# Patient Record
Sex: MALE
[De-identification: names, ages, dates, MRNs, and addresses within clinical notes are randomized per-mention and may not be internally consistent; named-entity substitution may affect disease eponyms.]

---

## 2021-09-24 ENCOUNTER — HOSPITAL ENCOUNTER (EMERGENCY)
Dept: HOSPITAL 63 - ER | Age: 21
Discharge: HOME | End: 2021-09-24
Payer: OTHER GOVERNMENT

## 2021-09-24 VITALS — DIASTOLIC BLOOD PRESSURE: 73 MMHG | SYSTOLIC BLOOD PRESSURE: 151 MMHG

## 2021-09-24 VITALS — HEIGHT: 71 IN | BODY MASS INDEX: 30.71 KG/M2 | WEIGHT: 219.36 LBS

## 2021-09-24 DIAGNOSIS — E87.6: Primary | ICD-10-CM

## 2021-09-24 DIAGNOSIS — R11.2: ICD-10-CM

## 2021-09-24 DIAGNOSIS — F17.210: ICD-10-CM

## 2021-09-24 LAB
ALBUMIN SERPL-MCNC: 3.6 G/DL (ref 3.4–5)
ALBUMIN/GLOB SERPL: 0.9 {RATIO} (ref 1–1.7)
ALP SERPL-CCNC: 86 U/L (ref 46–116)
ALT SERPL-CCNC: 34 U/L (ref 16–63)
ANION GAP SERPL CALC-SCNC: 10 MMOL/L (ref 6–14)
APTT PPP: YELLOW S
AST SERPL-CCNC: 14 U/L (ref 15–37)
BACTERIA #/AREA URNS HPF: 0 /HPF
BASOPHILS # BLD AUTO: 0.1 X10^3/UL (ref 0–0.2)
BASOPHILS NFR BLD: 1 % (ref 0–3)
BILIRUB SERPL-MCNC: 0.5 MG/DL (ref 0.2–1)
BILIRUB UR QL STRIP: (no result)
BUN/CREAT SERPL: 9 (ref 6–20)
CA-I SERPL ISE-MCNC: 8 MG/DL (ref 8–26)
CALCIUM SERPL-MCNC: 9.5 MG/DL (ref 8.5–10.1)
CHLORIDE SERPL-SCNC: 104 MMOL/L (ref 98–107)
CO2 SERPL-SCNC: 29 MMOL/L (ref 21–32)
CREAT SERPL-MCNC: 0.9 MG/DL (ref 0.7–1.3)
EOSINOPHIL NFR BLD: 0.3 X10^3/UL (ref 0–0.7)
EOSINOPHIL NFR BLD: 3 % (ref 0–3)
ERYTHROCYTE [DISTWIDTH] IN BLOOD BY AUTOMATED COUNT: 14.1 % (ref 11.5–14.5)
FIBRINOGEN PPP-MCNC: CLEAR MG/DL
GFR SERPLBLD BASED ON 1.73 SQ M-ARVRAT: 107.6 ML/MIN
GLOBULIN SER-MCNC: 3.9 G/DL (ref 2.2–3.8)
GLUCOSE SERPL-MCNC: 92 MG/DL (ref 70–99)
GLUCOSE UR STRIP-MCNC: (no result) MG/DL
HCT VFR BLD CALC: 46.1 % (ref 39–53)
HGB BLD-MCNC: 15.4 G/DL (ref 13–17.5)
LIPASE: 49 U/L (ref 73–393)
LYMPHOCYTES # BLD: 3.2 X10^3/UL (ref 1–4.8)
LYMPHOCYTES NFR BLD AUTO: 36 % (ref 24–48)
MAGNESIUM SERPL-MCNC: 2.1 MG/DL (ref 1.8–2.4)
MCH RBC QN AUTO: 30 PG (ref 25–35)
MCHC RBC AUTO-ENTMCNC: 33 G/DL (ref 31–37)
MCV RBC AUTO: 89 FL (ref 79–100)
MONO #: 0.6 X10^3/UL (ref 0–1.1)
MONOCYTES NFR BLD: 7 % (ref 0–9)
NEUT #: 4.5 X10^3UL (ref 1.8–7.7)
NEUTROPHILS NFR BLD AUTO: 52 % (ref 31–73)
NITRITE UR QL STRIP: (no result)
PLATELET # BLD AUTO: 184 X10^3/UL (ref 140–400)
POTASSIUM SERPL-SCNC: 3.2 MMOL/L (ref 3.5–5.1)
PROT SERPL-MCNC: 7.5 G/DL (ref 6.4–8.2)
RBC # BLD AUTO: 5.2 X10^6/UL (ref 4.3–5.7)
RBC #/AREA URNS HPF: 0 /HPF (ref 0–2)
SODIUM SERPL-SCNC: 143 MMOL/L (ref 136–145)
SP GR UR STRIP: 1.02
SQUAMOUS #/AREA URNS LPF: (no result) /LPF
UROBILINOGEN UR-MCNC: 0.2 MG/DL
WBC # BLD AUTO: 8.7 X10^3/UL (ref 4–11)
WBC #/AREA URNS HPF: (no result) /HPF (ref 0–4)

## 2021-09-24 PROCEDURE — 80053 COMPREHEN METABOLIC PANEL: CPT

## 2021-09-24 PROCEDURE — 36415 COLL VENOUS BLD VENIPUNCTURE: CPT

## 2021-09-24 PROCEDURE — 81001 URINALYSIS AUTO W/SCOPE: CPT

## 2021-09-24 PROCEDURE — 96374 THER/PROPH/DIAG INJ IV PUSH: CPT

## 2021-09-24 PROCEDURE — 96361 HYDRATE IV INFUSION ADD-ON: CPT

## 2021-09-24 PROCEDURE — 96375 TX/PRO/DX INJ NEW DRUG ADDON: CPT

## 2021-09-24 PROCEDURE — 83735 ASSAY OF MAGNESIUM: CPT

## 2021-09-24 PROCEDURE — 85025 COMPLETE CBC W/AUTO DIFF WBC: CPT

## 2021-09-24 PROCEDURE — 83690 ASSAY OF LIPASE: CPT

## 2021-09-24 PROCEDURE — 99284 EMERGENCY DEPT VISIT MOD MDM: CPT

## 2021-09-24 NOTE — PHYS DOC
Past History


Past Medical History:  No Pertinent History


Past Surgical History:  Other


Additional Past Surgical Histo:  wisdom teeth extraction


Smoking:  Cigarettes


Alcohol Use:  None


Drug Use:  None





General Adult


EDM:


Chief Complaint:  NAUSEA/VOMITING/DIARRHEA





HPI:


HPI:





21-year-old male presents with report of nausea and vomiting x3 episodes that 

started last night.  Patient reports he vomited again this morning after eating.

 Patient had worked during the night and was instructed to present to the ER for

evaluation.  Patient reports concern for possible dehydration.  Denies abdominal

discomfort.  Denies fever or chills.  Denies known exposure to COVID-19.  Denies

cough, shortness of breath, or decreased sense of smell or taste.  Patient has 

been COVID-19 vaccinated with Moderna x 2.





Review of Systems:


Review of Systems:





Constitutional: Denies fever or chills 


Eyes: Denies redness or eye pain 


HENT: Denies nasal congestion or sore throat


Respiratory: Denies cough or shortness of breath 


Cardiovascular: Denies chest pain or palpitations


GI: Denies abdominal pain; reports nausea or vomiting


: Denies dysuria or hematuria


Musculoskeletal: Denies back pain or joint pain


Integument: Denies rash or skin lesions 


Neurologic: Denies headache, focal weakness or sensory changes





Complete systems were reviewed and found to be within normal limits, except as 

documented in this note.





Current Medications:


Current Meds:





Current Medications








 Medications


  (Trade)  Dose


 Ordered  [facility]  Start Time


 Stop Time Status Last Admin


Dose Admin


 


 Famotidine


  (Pepcid Vial)  20 mg  1X  ONCE  9/24/21 07:00


 9/24/21 07:01 DC 9/24/21 07:14


20 MG


 


 Ketorolac


 Tromethamine


  (Toradol 15mg


 Vial)  15 mg  1X  ONCE  9/24/21 07:00


 9/24/21 07:01 DC 9/24/21 07:13


15 MG


 


 Ondansetron HCl


  (Zofran)  4 mg  1X  ONCE  9/24/21 07:00


 9/24/21 07:01 DC 9/24/21 07:13


4 MG


 


 Sodium Chloride  1,000 ml @ 


 1,000 mls/hr  Q1H  9/24/21 07:00


 9/24/21 07:59  9/24/21 07:13


1,000 MLS/HR











Allergies:


Allergies:





Allergies








Coded Allergies Type Severity Reaction Last Updated Verified


 


  No Known Drug Allergies    9/24/21 No











Physical Exam:


PE:





Constitutional: Well developed, well nourished, no acute distress, non-toxic 

appearance


HENT: Normocephalic, atraumatic


Eyes: Conjunctiva normal, no discharge


Neck: Normal range of motion, supple


Lungs & Thorax:  No respiratory distress, equal chest rise and fall


Abdomen: Soft, no tenderness


Skin: Warm, dry, no erythema, no rash


Back: No tenderness, no CVA tenderness


Extremities: No tenderness, ROM intact, no edema


Neurologic: Alert and oriented X 3, no focal deficits noted


Psychologic: Affect normal, judgment normal





Current Patient Data:


Labs:





                                Laboratory Tests








Test


 9/24/21


06:55 9/24/21


07:05


 


Urine Collection Type Unknown   


 


Urine Color Yellow   


 


Urine Clarity Clear   


 


Urine pH 6.0   


 


Urine Specific Gravity 1.025   


 


Urine Protein


 Neg


(NEG-TRACE) 





 


Urine Glucose (UA)


 Neg mg/dL


(NEG) 





 


Urine Ketones (Stick)


 Neg mg/dL


(NEG) 





 


Urine Blood Neg (NEG)   


 


Urine Nitrite Neg (NEG)   


 


Urine Bilirubin Neg (NEG)   


 


Urine Urobilinogen Dipstick


 0.2 mg/dL (0.2


mg/dL) 





 


Urine Leukocyte Esterase Neg (NEG)   


 


Urine RBC 0 /HPF (0-2)   


 


Urine WBC


 Occ /HPF (0-4)


 





 


Urine Squamous Epithelial


Cells Few /LPF  


 





 


Urine Bacteria


 0 /HPF (0-FEW)


 





 


Urine Mucus Mod /LPF   


 


White Blood Count


 


 8.7 x10^3/uL


(4.0-11.0)


 


Red Blood Count


 


 5.20 x10^6/uL


(4.30-5.70)


 


Hemoglobin


 


 15.4 g/dL


(13.0-17.5)


 


Hematocrit


 


 46.1 %


(39.0-53.0)


 


Mean Corpuscular Volume


 


 89 fL ()





 


Mean Corpuscular Hemoglobin  30 pg (25-35)  


 


Mean Corpuscular Hemoglobin


Concent 


 33 g/dL


(31-37)


 


Red Cell Distribution Width


 


 14.1 %


(11.5-14.5)


 


Platelet Count


 


 184 x10^3/uL


(140-400)


 


Neutrophils (%) (Auto)  52 % (31-73)  


 


Lymphocytes (%) (Auto)  36 % (24-48)  


 


Monocytes (%) (Auto)  7 % (0-9)  


 


Eosinophils (%) (Auto)  3 % (0-3)  


 


Basophils (%) (Auto)  1 % (0-3)  


 


Neutrophils # (Auto)


 


 4.5 x10^3uL


(1.8-7.7)


 


Lymphocytes # (Auto)


 


 3.2 x10^3/uL


(1.0-4.8)


 


Monocytes # (Auto)


 


 0.6 x10^3/uL


(0.0-1.1)


 


Eosinophils # (Auto)


 


 0.3 x10^3/uL


(0.0-0.7)


 


Basophils # (Auto)


 


 0.1 x10^3/uL


(0.0-0.2)


 


Sodium Level


 


 143 mmol/L


(136-145)


 


Potassium Level


 


 3.2 mmol/L


(3.5-5.1)  L


 


Chloride Level


 


 104 mmol/L


()


 


Carbon Dioxide Level


 


 29 mmol/L


(21-32)


 


Anion Gap  10 (6-14)  


 


Blood Urea Nitrogen


 


 8 mg/dL (8-26)





 


Creatinine


 


 0.9 mg/dL


(0.7-1.3)


 


Estimated GFR


(Cockcroft-Gault) 


 107.6  





 


BUN/Creatinine Ratio  9 (6-20)  


 


Glucose Level


 


 92 mg/dL


(70-99)


 


Calcium Level


 


 9.5 mg/dL


(8.5-10.1)


 


Magnesium Level  Pending  


 


Total Bilirubin  Pending  


 


Aspartate Amino Transferase


(AST) 


 Pending  





 


Alanine Aminotransferase (ALT)  Pending  


 


Alkaline Phosphatase  Pending  


 


Total Protein  Pending  


 


Albumin  Pending  


 


Albumin/Globulin Ratio  Pending  


 


Lipase  Pending  








Vital Signs:





                                   Vital Signs








  Date Time  Temp Pulse Resp B/P (MAP) Pulse Ox O2 Delivery O2 Flow Rate FiO2


 


9/24/21 06:44 98.4 104 14 143/80 (101) 97 Room Air  











EKG:


EKG:


[]





Radiology/Procedures:


Radiology/Procedures:


[]





Heart Score:


C/O Chest Pain:  N/A





Course & Med Decision Making:


Course & Med Decision Making


Pertinent Labs and Imaging studies reviewed. (See chart for details)





Patient presents with report of nausea and vomiting x3 episodes which started 

last night.  Abdomen nonperitoneal.  Symptomatic treatment provided.  IV fluid 

hydration given.  Labs obtained and posted to chart.





Patient reports interval improvement of symptoms.





Patient stable for discharge with outpatient follow-up with PCP. Discussed 

findings and plan with patient, who acknowledges understanding and agreement.





Dragon Disclaimer:


Dragon Disclaimer:


This electronic medical record was generated, in whole or in part, using a voice

 recognition dictation system.





Departure


Departure:


Impression:  


   Primary Impression:  


   Nausea & vomiting


   Qualified Codes:  R11.2 - Nausea with vomiting, unspecified


   Additional Impression:  


   Hypokalemia


Disposition:  01 HOME / SELF CARE / HOMELESS


Condition:  STABLE


Referrals:  


PCP,UNKNOWN (PCP)


Patient Instructions:  Clear Liquid Diet, Easy-to-Read, Hypokalemia, Nausea and 

Vomiting, Easy-to-Read, Potassium Content of Foods


Scripts


Famotidine (PEPCID) 20 Mg Tablet


1 TAB PO BID for Gastritis, #14 TAB


   Prov: NADIA THAYER DO         9/24/21 


Ondansetron (ONDANSETRON ODT) 4 Mg Tab.rapdis


1 TAB PO PRN Q6-8HRS PRN for NAUSEA, #16 TAB


   Prov: NADIA THAYER DO         9/24/21











NADIA THAYER DO             Sep 24, 2021 07:56

## 2022-04-30 ENCOUNTER — HOSPITAL ENCOUNTER (EMERGENCY)
Dept: HOSPITAL 63 - ER | Age: 22
LOS: 1 days | Discharge: HOME | End: 2022-05-01
Payer: OTHER GOVERNMENT

## 2022-04-30 VITALS — HEIGHT: 71 IN | WEIGHT: 213.63 LBS | BODY MASS INDEX: 29.91 KG/M2

## 2022-04-30 DIAGNOSIS — F17.210: ICD-10-CM

## 2022-04-30 DIAGNOSIS — R45.851: Primary | ICD-10-CM

## 2022-04-30 PROCEDURE — 99285 EMERGENCY DEPT VISIT HI MDM: CPT

## 2022-04-30 NOTE — PHYS DOC
Past History


Past Medical History:  No Pertinent History


Past Surgical History:  Other


Additional Past Surgical Histo:  wisdom teeth extraction


Smoking:  Cigarettes


Alcohol Use:  None


Drug Use:  None





Adult General


HPI


HPI


Patient is a 22-year-old male, in the  who presents with a chief 

complaint of passing suicidal ideations.  Denies any plan.  Denies any previous 

history of suicidal ideations or attempts.  States he does not like it in the 

 and would like to get out and is probably only get out this November.  

States he is looking forward to getting out and getting  and having 

children.  Denies any recent travels, traumas, illnesses, fevers, chest pain, 

shortness of breath, abdominal pain, nausea, vomiting.  Denies any alcohol or 

drug use.





Review of Systems


Review of Systems


Review of systems otherwise unremarkable except noted in HPI





Allergies


Allergies





Allergies








Coded Allergies Type Severity Reaction Last Updated Verified


 


  No Known Drug Allergies    9/24/21 No











Physical Exam


Physical Exam





Constitutional: Well developed, well nourished, no acute distress, non-toxic 

appearance. []


HENT: Normocephalic, atraumatic, oropharynx moist,


Eyes:  conjunctiva normal, no discharge. [] 


Neck: Normal range of motion, no tenderness, supple, no stridor. [] 


Cardiovascular:Heart rate regular rhythm, no murmur []


Lungs & Thorax: No respiratory distress


Abdomen:soft, no tenderness, no masses, no pulsatile masses. [] 


Skin: Warm, dry, no erythema, no rash. [] 


Back: No tenderness, no CVA tenderness. [] 


Extremities: No tenderness, no cyanosis, no clubbing, ROM intact, no edema. [] 


Neurologic: Alert and oriented X 3, normal motor function, normal sensory 

function, no focal deficits noted. []


Psychologic: Affect normal, suicidal ideations, no homicidal ideations, no 

hallucinations





EKG


EKG


[]





Radiology/Procedures


Radiology/Procedures


[]





Heart Score


C/O Chest Pain:  No


Risk Factors:


Risk Factors:  DM, Current or recent (<one month) smoker, HTN, HLP, family 

history of CAD, obesity.


Risk Scores:


Risk Factors:  DM, Current or recent (<one month) smoker, HTN, HLP, family 

history of CAD, obesity.





Course & Med Decision Making


Course & Med Decision Making


Patient is a 22-year-old male who presents with passing suicidal thoughts


Vital signs nonconcerning.  Physical exam noted above.  Psychiatric assessment 

team liaison felt patient was not actually suicidal, just probably suffered some

 depression and wants to get out of the 


Patient getting along  probably this November.  Recommended home with a 

safety plan [place] 

[place].  Patient and  supervisor okay with this plan, feels safe and 

ready to discharge without any medical work-up


Advised to follow-up with primary care physician and counselor first thing 

tomorrow.  Gave return precautions to the ED.  Patient grateful, verbalized 

understanding and agreed with plan of discharge





Dragon Disclaimer


Dragon Disclaimer


This electronic medical record was generated, in whole or in part, using a voice

 recognition dictation system.





Departure


Departure:


Impression:  


   Primary Impression:  


   Suicidal ideations


Disposition:  01 HOME / SELF CARE / HOMELESS


Condition:  STABLE


Referrals:  


PCP,UNKNOWN (PCP)








FLETCHER DASILVA MD


Patient Instructions:  Suicidal Feelings, How to Help Yourself





Additional Instructions:  


Thank you for coming into the emergency department tonight and allowing us to 

take care of you.  Please read the attached information carefully to go over 

things we discussed.  Please go back over the safety plan given to you by your 

psychiatric liaison and the community resource packet.  Please keep your 

upcoming appointment with your primary care [place].  

Please come back to the emergency department immediately with new or concerning 

symptoms as we discussed.











HEAVENLY EDWARDS MD               Apr 30, 2022 22:30

## 2022-05-01 VITALS — SYSTOLIC BLOOD PRESSURE: 138 MMHG | DIASTOLIC BLOOD PRESSURE: 88 MMHG
